# Patient Record
Sex: FEMALE | Race: WHITE | Employment: OTHER | URBAN - METROPOLITAN AREA
[De-identification: names, ages, dates, MRNs, and addresses within clinical notes are randomized per-mention and may not be internally consistent; named-entity substitution may affect disease eponyms.]

---

## 2020-02-26 PROBLEM — R09.A2 GLOBUS PHARYNGEUS: Status: ACTIVE | Noted: 2020-02-26

## 2020-02-26 PROBLEM — R09.89 GLOBUS PHARYNGEUS: Status: ACTIVE | Noted: 2020-02-26

## 2020-02-26 PROBLEM — R09.82 POST-NASAL DRAINAGE: Status: ACTIVE | Noted: 2020-02-26

## 2022-03-24 PROBLEM — C21.0 ANAL CANCER (HCC): Status: ACTIVE | Noted: 2022-03-24

## 2022-09-30 ENCOUNTER — HOSPITAL ENCOUNTER (OUTPATIENT)
Facility: HOSPITAL | Age: 72
Setting detail: HOSPITAL OUTPATIENT SURGERY
Discharge: HOME | End: 2022-09-30
Attending: COLON & RECTAL SURGERY | Admitting: COLON & RECTAL SURGERY
Payer: MEDICARE

## 2022-09-30 ENCOUNTER — ANESTHESIA (OUTPATIENT)
Dept: ENDOSCOPY | Facility: HOSPITAL | Age: 72
End: 2022-09-30
Payer: MEDICARE

## 2022-09-30 ENCOUNTER — ANESTHESIA EVENT (OUTPATIENT)
Dept: ENDOSCOPY | Facility: HOSPITAL | Age: 72
End: 2022-09-30
Payer: MEDICARE

## 2022-09-30 VITALS
TEMPERATURE: 98 F | DIASTOLIC BLOOD PRESSURE: 68 MMHG | WEIGHT: 157 LBS | BODY MASS INDEX: 22.48 KG/M2 | HEART RATE: 66 BPM | OXYGEN SATURATION: 98 % | SYSTOLIC BLOOD PRESSURE: 127 MMHG | HEIGHT: 70 IN | RESPIRATION RATE: 17 BRPM

## 2022-09-30 DIAGNOSIS — Z85.048 HISTORY OF ANAL CANCER: ICD-10-CM

## 2022-09-30 PROCEDURE — 0DBP8ZX EXCISION OF RECTUM, VIA NATURAL OR ARTIFICIAL OPENING ENDOSCOPIC, DIAGNOSTIC: ICD-10-PCS | Performed by: COLON & RECTAL SURGERY

## 2022-09-30 PROCEDURE — 88305 TISSUE EXAM BY PATHOLOGIST: CPT | Performed by: COLON & RECTAL SURGERY

## 2022-09-30 RX ORDER — SODIUM CHLORIDE, SODIUM LACTATE, POTASSIUM CHLORIDE, CALCIUM CHLORIDE 600; 310; 30; 20 MG/100ML; MG/100ML; MG/100ML; MG/100ML
INJECTION, SOLUTION INTRAVENOUS CONTINUOUS
OUTPATIENT
Start: 2022-09-30

## 2022-09-30 RX ORDER — SODIUM CHLORIDE, SODIUM LACTATE, POTASSIUM CHLORIDE, CALCIUM CHLORIDE 600; 310; 30; 20 MG/100ML; MG/100ML; MG/100ML; MG/100ML
INJECTION, SOLUTION INTRAVENOUS CONTINUOUS
Status: DISCONTINUED | OUTPATIENT
Start: 2022-09-30 | End: 2022-09-30

## 2022-09-30 RX ORDER — LIDOCAINE HYDROCHLORIDE 10 MG/ML
INJECTION, SOLUTION EPIDURAL; INFILTRATION; INTRACAUDAL; PERINEURAL AS NEEDED
Status: DISCONTINUED | OUTPATIENT
Start: 2022-09-30 | End: 2022-09-30 | Stop reason: SURG

## 2022-09-30 RX ORDER — NALOXONE HYDROCHLORIDE 0.4 MG/ML
80 INJECTION, SOLUTION INTRAMUSCULAR; INTRAVENOUS; SUBCUTANEOUS AS NEEDED
OUTPATIENT
Start: 2022-09-30 | End: 2022-09-30

## 2022-09-30 RX ADMIN — LIDOCAINE HYDROCHLORIDE 40 MG: 10 INJECTION, SOLUTION EPIDURAL; INFILTRATION; INTRACAUDAL; PERINEURAL at 14:18:00

## 2022-09-30 RX ADMIN — SODIUM CHLORIDE, SODIUM LACTATE, POTASSIUM CHLORIDE, CALCIUM CHLORIDE: 600; 310; 30; 20 INJECTION, SOLUTION INTRAVENOUS at 14:16:00

## 2022-09-30 NOTE — DISCHARGE SUMMARY
Outpatient Surgery Brief Discharge Summary         Patient ID:  Lala Carvajal  [id number]  81year old  9/11/1950    Discharge Diagnoses: History of anal cancer, anal rectal scar    Procedures: No discharge procedures on file. Discharged Condition: stable    Disposition: home    Patient Instructions: Follow-up with Di Burgess MD in 1-2 weeks.     Diet: regular diet  Activity: as tolerated    Edna Alvarez MD, FACS, FASCRS  9/30/2022  3:16 PM

## 2022-10-06 ENCOUNTER — EKG ENCOUNTER (OUTPATIENT)
Dept: LAB | Age: 72
End: 2022-10-06
Attending: FAMILY MEDICINE
Payer: MEDICARE

## 2022-10-06 ENCOUNTER — LAB ENCOUNTER (OUTPATIENT)
Dept: LAB | Age: 72
End: 2022-10-06
Attending: FAMILY MEDICINE
Payer: MEDICARE

## 2022-10-06 DIAGNOSIS — Z01.818 PREOPERATIVE EXAMINATION, UNSPECIFIED: ICD-10-CM

## 2022-10-06 DIAGNOSIS — M16.11 PRIMARY OSTEOARTHRITIS OF RIGHT HIP: Primary | ICD-10-CM

## 2022-10-06 LAB
ALBUMIN SERPL-MCNC: 3.3 G/DL (ref 3.4–5)
ALBUMIN/GLOB SERPL: 0.9 {RATIO} (ref 1–2)
ALP LIVER SERPL-CCNC: 46 U/L
ALT SERPL-CCNC: 19 U/L
ANION GAP SERPL CALC-SCNC: 7 MMOL/L (ref 0–18)
AST SERPL-CCNC: 15 U/L (ref 15–37)
ATRIAL RATE: 69 BPM
BASOPHILS # BLD AUTO: 0.02 X10(3) UL (ref 0–0.2)
BASOPHILS NFR BLD AUTO: 0.6 %
BILIRUB SERPL-MCNC: 0.5 MG/DL (ref 0.1–2)
BUN BLD-MCNC: 14 MG/DL (ref 7–18)
CALCIUM BLD-MCNC: 8.8 MG/DL (ref 8.5–10.1)
CHLORIDE SERPL-SCNC: 108 MMOL/L (ref 98–112)
CO2 SERPL-SCNC: 25 MMOL/L (ref 21–32)
CREAT BLD-MCNC: 0.81 MG/DL
CRP SERPL-MCNC: <0.29 MG/DL (ref ?–0.3)
EOSINOPHIL # BLD AUTO: 0.26 X10(3) UL (ref 0–0.7)
EOSINOPHIL NFR BLD AUTO: 7.9 %
ERYTHROCYTE [DISTWIDTH] IN BLOOD BY AUTOMATED COUNT: 13.9 %
ERYTHROCYTE [SEDIMENTATION RATE] IN BLOOD: 37 MM/HR
FASTING STATUS PATIENT QL REPORTED: NO
GFR SERPLBLD BASED ON 1.73 SQ M-ARVRAT: 78 ML/MIN/1.73M2 (ref 60–?)
GLOBULIN PLAS-MCNC: 3.8 G/DL (ref 2.8–4.4)
GLUCOSE BLD-MCNC: 92 MG/DL (ref 70–99)
HCT VFR BLD AUTO: 36.2 %
HGB BLD-MCNC: 11.3 G/DL
IMM GRANULOCYTES # BLD AUTO: 0.01 X10(3) UL (ref 0–1)
IMM GRANULOCYTES NFR BLD: 0.3 %
LYMPHOCYTES # BLD AUTO: 0.46 X10(3) UL (ref 1–4)
LYMPHOCYTES NFR BLD AUTO: 13.9 %
MCH RBC QN AUTO: 30.6 PG (ref 26–34)
MCHC RBC AUTO-ENTMCNC: 31.2 G/DL (ref 31–37)
MCV RBC AUTO: 98.1 FL
MONOCYTES # BLD AUTO: 0.29 X10(3) UL (ref 0.1–1)
MONOCYTES NFR BLD AUTO: 8.8 %
NEUTROPHILS # BLD AUTO: 2.26 X10 (3) UL (ref 1.5–7.7)
NEUTROPHILS # BLD AUTO: 2.26 X10(3) UL (ref 1.5–7.7)
NEUTROPHILS NFR BLD AUTO: 68.5 %
OSMOLALITY SERPL CALC.SUM OF ELEC: 290 MOSM/KG (ref 275–295)
P AXIS: 71 DEGREES
P-R INTERVAL: 162 MS
PLATELET # BLD AUTO: 227 10(3)UL (ref 150–450)
POTASSIUM SERPL-SCNC: 4.1 MMOL/L (ref 3.5–5.1)
PROT SERPL-MCNC: 7.1 G/DL (ref 6.4–8.2)
Q-T INTERVAL: 434 MS
QRS DURATION: 84 MS
QTC CALCULATION (BEZET): 465 MS
R AXIS: 24 DEGREES
RBC # BLD AUTO: 3.69 X10(6)UL
SODIUM SERPL-SCNC: 140 MMOL/L (ref 136–145)
T AXIS: 60 DEGREES
VENTRICULAR RATE: 69 BPM
WBC # BLD AUTO: 3.3 X10(3) UL (ref 4–11)

## 2022-10-06 PROCEDURE — 36415 COLL VENOUS BLD VENIPUNCTURE: CPT

## 2022-10-06 PROCEDURE — 85025 COMPLETE CBC W/AUTO DIFF WBC: CPT

## 2022-10-06 PROCEDURE — 93010 ELECTROCARDIOGRAM REPORT: CPT | Performed by: INTERNAL MEDICINE

## 2022-10-06 PROCEDURE — 86140 C-REACTIVE PROTEIN: CPT

## 2022-10-06 PROCEDURE — 80053 COMPREHEN METABOLIC PANEL: CPT

## 2022-10-06 PROCEDURE — 85652 RBC SED RATE AUTOMATED: CPT

## 2022-10-06 PROCEDURE — 93005 ELECTROCARDIOGRAM TRACING: CPT

## 2025-03-14 ENCOUNTER — HOSPITAL ENCOUNTER (OUTPATIENT)
Facility: HOSPITAL | Age: 75
Setting detail: HOSPITAL OUTPATIENT SURGERY
Discharge: HOME | End: 2025-03-14
Attending: COLON & RECTAL SURGERY | Admitting: COLON & RECTAL SURGERY
Payer: MEDICARE

## 2025-03-14 ENCOUNTER — ANESTHESIA (OUTPATIENT)
Dept: ENDOSCOPY | Facility: HOSPITAL | Age: 75
End: 2025-03-14
Payer: MEDICARE

## 2025-03-14 ENCOUNTER — ANESTHESIA EVENT (OUTPATIENT)
Dept: ENDOSCOPY | Facility: HOSPITAL | Age: 75
End: 2025-03-14
Payer: MEDICARE

## 2025-03-14 VITALS
RESPIRATION RATE: 19 BRPM | SYSTOLIC BLOOD PRESSURE: 145 MMHG | OXYGEN SATURATION: 100 % | HEART RATE: 77 BPM | HEIGHT: 70 IN | BODY MASS INDEX: 27.2 KG/M2 | WEIGHT: 190 LBS | DIASTOLIC BLOOD PRESSURE: 123 MMHG

## 2025-03-14 PROCEDURE — 0DJD8ZZ INSPECTION OF LOWER INTESTINAL TRACT, VIA NATURAL OR ARTIFICIAL OPENING ENDOSCOPIC: ICD-10-PCS | Performed by: COLON & RECTAL SURGERY

## 2025-03-14 RX ORDER — SODIUM CHLORIDE, SODIUM LACTATE, POTASSIUM CHLORIDE, CALCIUM CHLORIDE 600; 310; 30; 20 MG/100ML; MG/100ML; MG/100ML; MG/100ML
INJECTION, SOLUTION INTRAVENOUS CONTINUOUS
Status: DISCONTINUED | OUTPATIENT
Start: 2025-03-14 | End: 2025-03-14

## 2025-03-14 RX ORDER — GLYCOPYRROLATE 0.2 MG/ML
INJECTION, SOLUTION INTRAMUSCULAR; INTRAVENOUS AS NEEDED
Status: DISCONTINUED | OUTPATIENT
Start: 2025-03-14 | End: 2025-03-14 | Stop reason: SURG

## 2025-03-14 RX ORDER — AMOXICILLIN 500 MG/1
500 CAPSULE ORAL AS NEEDED
COMMUNITY
End: 2025-03-14

## 2025-03-14 RX ORDER — LIDOCAINE HYDROCHLORIDE 10 MG/ML
INJECTION, SOLUTION EPIDURAL; INFILTRATION; INTRACAUDAL; PERINEURAL AS NEEDED
Status: DISCONTINUED | OUTPATIENT
Start: 2025-03-14 | End: 2025-03-14 | Stop reason: SURG

## 2025-03-14 RX ADMIN — SODIUM CHLORIDE, SODIUM LACTATE, POTASSIUM CHLORIDE, CALCIUM CHLORIDE: 600; 310; 30; 20 INJECTION, SOLUTION INTRAVENOUS at 15:48:00

## 2025-03-14 RX ADMIN — LIDOCAINE HYDROCHLORIDE 50 MG: 10 INJECTION, SOLUTION EPIDURAL; INFILTRATION; INTRACAUDAL; PERINEURAL at 15:15:00

## 2025-03-14 RX ADMIN — GLYCOPYRROLATE 0.2 MG: 0.2 INJECTION, SOLUTION INTRAMUSCULAR; INTRAVENOUS at 15:11:00

## 2025-03-14 NOTE — ANESTHESIA POSTPROCEDURE EVALUATION
Patient: Mandy Manriquez    Procedure Summary       Date: 03/14/25 Room / Location: [facility] ENDOSCOPY 04 / [facility] ENDOSCOPY    Anesthesia Start: 1510 Anesthesia Stop: 1554    Procedure: COLONOSCOPY Diagnosis:       History of anal cancer      (diverticulosis and mild radiation proctitis)    Surgeons: Toan Garcia MD Anesthesiologist: Tramaine Escamilla CRNA    Anesthesia Type: MAC ASA Status: 3            Anesthesia Type: MAC    Vitals Value Taken Time   /79 03/14/25 1552   Temp 36.6 03/14/25 1554   Pulse 87 03/14/25 1554   Resp 18 03/14/25 1554   SpO2 98 % 03/14/25 1554   Vitals shown include unfiled device data.    [facility] AN Post Evaluation:   Patient Evaluated in PACU  Patient Participation: complete - patient participated  Level of Consciousness: awake and awake and alert  Pain Score: 0  Pain Management: satisfactory to patient  Airway Patency:patent  Yes    Cardiovascular Status: acceptable  Respiratory Status: acceptable  Postoperative Hydration acceptable      Tramaine Escamilla CRNA  3/14/2025 3:54 PM

## 2025-03-14 NOTE — ANESTHESIA PREPROCEDURE EVALUATION
Anesthesia PreOp Note    HPI:     Mandy Manriquez is a 74 year old female who presents for preoperative consultation requested by: Toan Garcia MD    Date of Surgery: 3/14/2025    Procedure(s):  COLONOSCOPY  Indication: History of anal cancer    Relevant Problems   No relevant active problems       NPO:  Last Liquid Consumption Date: 03/14/25  Last Liquid Consumption Time: 0730  Last Solid Consumption Date: 03/13/25  Last Solid Consumption Time: 0630  Last Liquid Consumption Date: 03/14/25          History Review:  Patient Active Problem List    Diagnosis Date Noted    Anal cancer (HCC) 03/24/2022    Globus pharyngeus 02/26/2020    Post-nasal drainage 02/26/2020    Vitamin D deficiency     Vitamin B deficiency     Pituitary tumor     Multiple thyroid nodules     Hypothyroidism     History of squamous cell carcinoma     Disorder of optic chiasm     Asymmetrical sensorineural hearing loss     Mixed hyperlipidemia     History of malignant melanoma 01/01/1992       Past Medical History:    Allergic rhinitis    Anxiety    Asymmetrical sensorineural hearing loss    Atypical squamous cells of undetermined significance (ASCUS) on Papanicolaou smear of cervix    Back problem    Benign tumor of pituitary gland (HCC)    CAUSING VISION IMPAIRMENT(SEEING SHAWDOW IMAGE)    Calculus of kidney    Chronic renal impairment    Cyst of ovary    Disorder of bone and articular cartilage    Disorder of optic chiasm    Associated with pituitary neoplasms and/or disorders    Disorder of thyroid    Endometriosis    Epilepsy (HCC)    Exposure to medical diagnostic radiation    High cholesterol    History of kidney stones    History of malignant melanoma    rt thigh    History of squamous cell carcinoma    Hypothyroidism    Mixed hyperlipidemia    Multiple thyroid nodules    US 3/16 with s/p rt thyroidectomy and left with subcent nodule     Osteoporosis    Osteoporosis of the spine and mild osteopenia of left femoral neck.  Previous  DEXA was done 9/17 with a T score of -2.2 in the spine, left femoral neck -1.3    Personal history of antineoplastic chemotherapy    Pituitary tumor    Rectal cancer (HCC)    Squamous cell    Skin cancer    Visual impairment    LEFT EYE DUE TO TUMOR IN PITUITARY GLAND    Vitamin B deficiency    Vitamin D deficiency       Past Surgical History:   Procedure Laterality Date    Colonoscopy  08/1914    Dr. Maurer 5 year recall    Colonoscopy  10/23/2019    Libertad hyperplastic polyps recall in 7 yrs    Hysterectomy  12/11/2015    total    Other surgical history  2000    pituitary adenoma hypophysectomy    Other surgical history  2007    craniotomy for pituitary tumor removal    Thyroidectomy Right 1997       Prescriptions Prior to Admission[1]  Current Medications and Prescriptions Ordered in Epic[2]    Allergies[3]    Family History   Problem Relation Age of Onset    Heart Disease Mother     High Cholesterol Mother     Hypertension Mother     Osteoporosis Mother     Other (rheumatoid arthritis) Mother     Other (Hydrocephalus complications) Father     Migraines Sister     Melanoma Sister     Other (Other) Sister         skin cancer    Other (Multiple sclerosis) Sister     Other (Peptic ulcer) Sister     Colon Cancer Paternal Grandfather     Colon Cancer Maternal Grandfather      Social History     Socioeconomic History    Marital status:    Tobacco Use    Smoking status: Never    Smokeless tobacco: Never   Vaping Use    Vaping status: Never Used   Substance and Sexual Activity    Alcohol use: Yes     Alcohol/week: 1.0 - 2.0 standard drink of alcohol     Types: 1 - 2 Glasses of wine per week    Drug use: Never    Sexual activity: Not Currently     Partners: Male   Other Topics Concern    Special Diet Yes     Comment: Mediterranean    Seat Belt Yes       Available pre-op labs reviewed.             Vital Signs:  Body mass index is 27.26 kg/m².   height is 1.778 m (5' 10\") and weight is 86.2 kg (190 lb). Her blood  pressure is 154/67 and her pulse is 72. Her respiration is 17 and oxygen saturation is 98%.   Vitals:    03/10/25 1418 03/14/25 1432   BP:  154/67   Pulse:  72   Resp:  17   SpO2:  98%   Weight: 86.2 kg (190 lb)    Height: 1.778 m (5' 10\")         Anesthesia Evaluation     Patient summary reviewed    No history of anesthetic complications   Airway   Mallampati: II  TM distance: >3 FB  Neck ROM: full  Dental - Dentition appears grossly intact     Pulmonary - negative ROS and normal exam   Cardiovascular - normal exam  Exercise tolerance: good  (+) hypertension well controlled    Neuro/Psych    (+)  seizures well controlled, anxiety/panic attacks,        Comments: Had 1 seizure post 1d craniotomy    GI/Hepatic/Renal - negative ROS     Endo/Other - negative ROS   Abdominal                  Anesthesia Plan:   ASA:  3  Plan:   MAC  Informed Consent Plan and Risks Discussed With:  Patient  Discussed plan with:  CRNA      I have informed Mandy Manriquez and/or legal guardian or family member of the nature of the anesthetic plan, benefits, risks including possible dental damage if relevant, major complications, and any alternative forms of anesthetic management.   All of the patient's questions were answered to the best of my ability. The patient desires the anesthetic management as planned.  Tramaine Escamilla CRNA  3/14/2025 3:08 PM  Present on Admission:  **None**           [1]   Medications Prior to Admission   Medication Sig Dispense Refill Last Dose/Taking    amoxicillin 500 MG Oral Cap Take 1 capsule (500 mg total) by mouth as needed (Take four tablets as needed prior to procedure). Take four tablets as needed prior to procedure   Taking As Needed    atorvastatin 80 MG Oral Tab Take 1 tablet (80 mg total) by mouth daily. 90 tablet 3 3/12/2025    Levothyroxine Sodium 112 MCG Oral Tab Take 1 tablet (112 mcg total) by mouth daily.   3/14/2025 Morning    Multiple Vitamins-Minerals (MULTIVITAMIN ADULT) Oral Tab Take 1  tablet by mouth daily.   3/10/2025    OCUVITE-LUTEIN Oral Tab Take 1 tablet by mouth daily with breakfast.   3/10/2025   [2]   Current Facility-Administered Medications Ordered in Epic   Medication Dose Route Frequency Provider Last Rate Last Admin    lactated ringers infusion   Intravenous Continuous Toan Garcia MD 20 mL/hr at 03/14/25 1445 New Bag at 03/14/25 1445    sodium chloride 0.9% infusion  25 mL/hr Intravenous Continuous Saud Mcmullen MD         No current [facility]-ordered outpatient medications on file.   [3]   Allergies  Allergen Reactions    Corn NAUSEA AND VOMITING     Dizziness     Gabapentin DIZZINESS     dizzy    Other DIZZINESS     Cranberries/blueberries/cherries   Headache  Corn congestion/dizziness    Seasonal OTHER (SEE COMMENTS)     CONGESTION

## 2025-03-14 NOTE — DISCHARGE INSTRUCTIONS
ENDOSCOPY DISCHARGE INSTRUCTIONS    Procedure Performed:   Colonoscopy    Endoscopist: No name on file  FINDINGS:   Diverticulosis (pockets in colon that develop with age and lack of fiber intake) and mild radiation proctitis    MEDICATIONS:  You may resume all other medications today    DIET:  High fiber diet    BIOPSIES:  No biopsies were taken    X-RAYS/LABS:   No X-rays/Labs were ordered today    ADDITIONAL RECOMMENDATIONS:  Repeat colonoscopy in 5 years, March 2030      Activity for remainder of today:    REST TODAY  DO NOT drive or operate heavy machinery  DO NOT drink any alcoholic beverages  DO NOT sign any legal documents or make any important decisions    After your procedure(s):  It is not unusual to feel bloated or gassy .  Passing gas and belching is encouraged. Lying on your left side with your knees flexed may relieve the discomfort. A hot pack to the abdomen may also help.    After your gastroscopy (upper endoscopy): You may experience a slight sore throat which will subside. Throat lozenges or salt water gargle can be used.    FOLLOW-UP:  Contact the office for follow-up appointment is needed or if you develop any of the following:    Severe abdominal pain/discomfort     Excessive bleeding                     Black tarry stool    Difficulty breathing/swallowing      Persistent nausea/vomiting  Fever above 100 degrees or chills            Home Care Instructions for Colonoscopy with Sedation    Diet:  - Resume your regular diet as tolerated unless otherwise instructed.  - Start with light meals to minimize bloating.  - Do not drink alcohol today.    Medication:  - If you have questions about resuming your normal medications, please contact your Primary Care Physician.    Activities:  - Take it easy today. Do not return to work today.  - Do not drive today.  - Do not operate any machinery today (including kitchen equipment).    Colonoscopy:  - You may notice some rectal \"spotting\" (a little blood on  the toilet tissue) for a day or two after the exam. This is normal.  - If you experience any rectal bleeding (not spotting), persistent tenderness or sharp severe abdominal pains, oral temperature over 100 degrees Fahrenheit, light-headedness or dizziness, or any other problems, contact your doctor.    **If unable to reach your doctor, please go to the [facility] Emergency Room**    - Your referring physician will receive a full report of your examination.  - If you do not hear from your doctor's office within two weeks of your biopsy, please call them for your results.    You may be able to see your laboratory results in [employer] between 4 and 7 business days.  In some cases, your physician may not have viewed the results before they are released to [employer].  If you have questions regarding your results contact the physician who ordered the test/exam by phone or via [employer] by choosing \"Ask a Medical Question.\"

## 2025-03-14 NOTE — DISCHARGE SUMMARY
Outpatient Surgery Brief Discharge Summary         Patient ID:  Mandy Manriquez  [id number]  74 year old  9/11/1950    Admission date:  03/14/25    Discharge date: 03/14/25    Discharge Diagnoses: diverticulosis and mild radiation proctitis    Procedures: No discharge procedures on file.    Discharged Condition: stable    Disposition: home    Patient Instructions: repeat colonoscopy in 5 years    Diet: regular diet  Activity: no driving for 24 hours    Toan Garcia MD, FACS, FASCRS  3/14/2025  3:56 PM

## 2025-03-14 NOTE — OPERATIVE REPORT
COLONOSCOPY REPORT    Patient Name: Mandy Manriquez    Date of Surgery: 03/14/25     Preoperative Diagnosis: History of anal cancer, history colon polyps    Postoperative Diagnosis: same    Primary Surgeon: Toan Garcia MD    Assistant: none    Procedures: Colonoscopy    Surgical Findings:  Left posterior scar at anorectal junction, without mass effect  Angulated, fixed sigmoid  Diverticulosis sigmoid > descending and transverse    Anesthesia: MAC MAC    Complications: none    Specimen: none    Condition: good    Estimated Blood Loss: none      INDICATIONS:   The patient is a 74 year old female with history of anal cancer treated with Thea protocol and history colon polyps. It is now 3 year since she underwent treatment and presents for surveillance colonoscopy. The procedure, indications, risks, benefits and alternatives were discussed with the patient prior to the procedure. All questions were answered, and she wished to proceed.       Last colonoscopy:  March 10, 2022      BOWEL PREP:  Aronchick bowel prep: 1    Aronchick bowel prep scale:  5 Inadequate (repeat preparation needed)  4 Poor (semisolid stool could not be suctioned and <90% of mucosa seen)  3 Fair (semisolid stool could not be suctioned, but >90% of mucosa seen)  2 Good (clear liquid covering up to 25% of mucosa, but >90% of mucosa seen)  1 Excellent (>95% of mucosa seen)    COLONOSCOPY TIMES:  Start:  1510  Cecum:  1535  End:  1550    TECHNIQUE:   The patient was taken to the endoscopy suite and appropriate monitoring for deep sedation established. She was then sedated by anesthesia and placed in left lateral decubitus position. Digital rectal examination was performed which was notable for left posterior scar at anorectal junction without mass effect.     The colonoscope was then inserted transanally and advanced to the cecum. The cecum was identified by the appendiceal orifice and ileocecal valve. Bowel preparation was excellent. Careful  inspection of the mucosa was made upon withdrawal of the colonoscope.     There were no polyps. Diverticulosis was present in the sigmoid > descending/transverse colon. There were mild changes of radiation proctitis. A scar was present at the anorectal junction without mass effect. Retroflexion was attempted in the rectum but was not feasible due to limited rectal compliance.    The colonoscope was removed, and the patient tolerated the procedure well. The abdomen was soft and nontender at the end of the examination.      IMPRESSION:  Diverticulosis  Mild radiation proctitis    RECOMMENDATIONS:  Repeat colonoscopy in 5 years (March 2030)      Toan Garcia MD    _____________________________________   Attending Surgeon: Toan Garcia MD   Dictated By: Toan Garcia MD

## 2025-03-14 NOTE — H&P
[facility]  Preop H&P - Colon and Rectal Surgery  Toan Garcia MD    CHIEF COMPLAINT: Patient presents with: history colon polyps, history SCCa anal canal        HISTORY OF PRESENT ILLNESS:  Mandy Manriquez is a 74 year old female who presents for colonoscopy for adenoma surveillance.  Thea protocol was completed late June 2022.  She last saw Dr. LI Centeno in Oncology on 10/19/2023, surveillance PET scan advised for September 2024 not completed.  Her last colonoscopy was in March 2022.    INTERVAL HISTORY  She has been feeling well, has not noted any significant symptoms.   Stool caliber slightly larger  Bms other wise are unchanged.  There is no anal pain or bleeding.    INTERVAL HISTORY (6/04/2024)  She has been feeling well, has not noted any significant symptoms.   She notes an itching/scratching feeling when she is in the shower.  This was thought to be related to the soap she has been using, and she has been using Hibiclens with better tolerance.  Blood has not been seen with the exception of a spot of blood on the towel after a bath yesterday.  Anal pain is denied.    Bowel habits are unchanged except urgency with fatty foods.   Stools are slightly decreased in caliber, but are not considered narrow.   She has 2 bowel movements in a short period of time each morning.  She has not had incontinence.    INTERVAL HISTORY (8/26/2022)  Anal pain was present during treatment, which is now nearly resolved.  She is not noting bleeding or discharge from the anus.  She denies vaginal discharge.    BMs are now twice daily, soft and formed (had been narrow but now somewhat larger), urgency is present.  Diarrhea is induced with fatty food intake.    INITIAL HISTORY (3/25/2022)  She has had rectal bleeding for about 3 months.  It is intermittent, only associated with BMs, and seen in the toilet water.    She has noted a drop of blood or brown discharge from the vagina.  She has not noted air from the vagina.    Bowel  habits:   Frequency: Baseline each morning, recently may have 2 BMs over 1 hour  Consistency: Soft and formed, more narrow recently  Urgency: yes, 1 minute warning  Continence: Rare incontinence    Colonoscopy was performed and showed a mass in the rectum.  She has since had a rectal ultrasound, CT, MRI and is scheduled for a PET scan on 3/30/2022.    HISTORY:  Past Medical History:   Diagnosis Date   Allergic rhinitis   Anal cancer (HCC) 03/2022   Squamous cell   Anxiety   Asymmetrical sensorineural hearing loss   Atypical squamous cells of undetermined significance (ASCUS) on Papanicolaou smear of cervix   Calculus of kidney   Chronic renal impairment   Colon cancer (HCC)   2022   Cyst of ovary   Disorder of bone and articular cartilage   Disorder of optic chiasm   Associated with pituitary neoplasms and/or disorders   Disorder of thyroid   Endometriosis   Epilepsy (HCC)   had seizure post op. not on meds since   High cholesterol   History of kidney stones   History of malignant melanoma 1992   rt thigh   History of squamous cell carcinoma   Hypothyroidism   Mixed hyperlipidemia   Multiple thyroid nodules   US 3/16 with s/p rt thyroidectomy and left with subcent nodule   Osteoporosis 03/04/2020   Osteoporosis of the spine and mild osteopenia of left femoral neck. Previous DEXA was done 9/17 with a T score of -2.2 in the spine, left femoral neck -1.3. Meds started 3/20   Pituitary tumor   Seizure disorder (HCC) 2008   POST OF BRAIN SURGERY JUST ONCE   Skin cancer   Vitamin B deficiency   Vitamin D deficiency     Past Surgical History:   Procedure Laterality Date   COLONOSCOPY 08/1914   Dr. Maurer 5 year recall   COLONOSCOPY 10/23/2019   [place] hyperplastic polyps recall in 7 yrs   HYSTERECTOMY 12/11/2015   total   OTHER SURGICAL HISTORY 2000   pituitary adenoma hypophysectomy   OTHER SURGICAL HISTORY 2007   craniotomy for pituitary tumor removal   THYROIDECTOMY Right 1997   TOTAL HIP REPLACEMENT Right 12/2022      Family History   Problem Relation Age of Onset   Heart Disease Mother   High Cholesterol Mother   Hypertension Mother   Osteoporosis Mother   Other (rheumatoid arthritis) Mother   Other (Hydrocephalus complications) Father   Migraines Sister   Melanoma Sister   Other (Other) Sister   skin cancer   Other (Multiple sclerosis) Sister   Other (Peptic ulcer) Sister   Colon Cancer Paternal Grandfather   Colon Cancer Maternal Grandfather     Social History  Tobacco Use  Smoking status: Never  Smokeless tobacco: Never  Vaping Use  Vaping status: Never Used  Alcohol use: Yes  Comment: social  Drug use: Never        CURRENT MEDICATIONS:   Current Outpatient Medications   Medication Sig Dispense Refill   atorvastatin 80 MG Oral Tab TAKE 1 TABLET EVERY DAY 90 tablet 1   loratadine 10 MG Oral Tab Take 10 mg by mouth daily.   cholecalciferol 25 MCG (1000 UT) Oral Tab Take 1,000 Units by mouth daily.   TURMERIC OR Take by mouth.   Levothyroxine Sodium 112 MCG Oral Tab Take 112 mcg by mouth daily.   Multiple Vitamins-Minerals (MULTIVITAMIN ADULT) Oral Tab Take 1 tablet by mouth daily.   OCUVITE-LUTEIN Oral Tab Take 1 tablet by mouth daily with breakfast.         ALLERGIES:  Corn, Gabapentin, and Other    REVIEW OF SYSTEMS:  Constitutional: Decreased appetite, 10 pound weight loss since cancer diagnosis  Eyes: Ocular neuromyotonia, from pituitary tumor radiation  Ears/Nose/Throat: Decreased hearing  Respiratory: normal  Cardiac/Vascular: normal  GI: See HPI  : History kidney stones  Musculoskeletal: Joint pain, back pain  Skin: History skin cancers (BCC, SCCa, and Melanoma)  Neurologic: normal  Psychiatric: normal  Endocrine: normal  Hematology/Lymphatics: normal  Allergy/Immunology: normal    PHYSICAL EXAM:   Ht 5' 10\" (1.778 m)  Wt 190 lb (86.2 kg)  LMP (LMP Unknown)  BMI 27.26 kg/m²   Body mass index is 27.26 kg/m².    CONSTITUTIONAL: awake, alert, cooperative, no apparent distress, and appears stated age  EYES: Lids and  lashes normal, sclera clear, conjunctiva normal  ENT: Normocephalic, without obvious abnormality, atraumatic  NECK: Supple, symmetrical, trachea midline, no adenopathy, thyroid symmetric, not enlarged and no tenderness  HEMATOLOGIC/LYMPHATICS: No cervical lymphadenopathy, no supraclavicular lymphadenopathy, no inguinal lymphadenopathy  LUNGS: No increased work of breathing, good air exchange, clear to auscultation bilaterally, no crackles or wheezing  CARDIOVASCULAR: Normal apical impulse, regular rate and rhythm, and no murmur noted, no pedal edema  ABDOMEN: Lower abdominal scar, normal bowel sounds, soft, non-distended, non-tender, no masses palpated, no hepatosplenomegaly    RECTAL:   External skin is normal  Anal tags are not notefd  Prolapse is not noted with straining  Resting tone is normal  Squeeze tone is normal  Mass is not palpable  Scar palpable left lateral anal canal, no mass noted    RECTAL: (initial exam, 3/25/2022)  External skin is normal  Anal tags are not notefd  Prolapse is not noted with straining  Resting tone is normal  Squeeze tone is normal  Mass is palpable  Left lateral proximal anal canal, at level of pelvic floor muscles  3 cm diameter  Firm, fixed, excavated    GENITOURINARY:  Bidigital exam without vaginal invasion    MUSCULOSKELETAL: Full range of motion noted. Motor strength is 5 out of 5 all extremities bilaterally.   SKIN: no rashes and no jaundice  PSYCHIATRIC:   Orientation: normal  Appearance: normal  Behavior: normal  Attitude toward examiner: normal  Affect: normal  Judgment: Normal    DATA:   COLONOSCOPY 3/10/2022 - Dr. LI Maurer    EUS 3/14/2022    CT A/P 3/18/2022  IMPRESSION:   Low rectal mass extending to the anal canal without evidence of metastasis.     MRI Rectum 3/24/2022  FINDINGS:   Tumor size: 3.6 cm craniocaudal dimension; 2.5 cm x 3.2 cm transverse dimensions.   There is a heterogeneously enhancing mass epicentered along the anal canal extending into the lower    rectum. The mass involves the anterior, left lateral, and posterior walls extending from 11:00 to   8:00 along the clock face. There is extension through the left internal sphincter, intersphincteric   fat, external sphincter, as well as the left levator ani muscles.     T stage: T2     Functional sequences:   DWI: Restricted diffusion.     Lymph nodes: N0 no visible or suspicious regional lymph nodes.     Incidental findings: There is colonic diverticulosis. The urinary bladder is mildly distended and   grossly unremarkable. There is no pelvic free fluid.    Flex Sig 9/30/2022  Rectal Exam: palpable firm area with nodular surface left anterior at anorectal junction.    Findings:   Scarring and hypertrophic anal papillae am mucosal nodule, no discrete mass  Biopsies obtained from the scar and mucosal nodule    Impression: History anal cancer, no clear cut persistent mass    Final Diagnosis:     Anal/rectal junction; biopsy:  Fragment of rectal glandular mucosa demonstrating focal minimal/mild acute proctitis characterized by preservation of the glandular architecture with focal hyperplastic mucosal changes, focal minimal/mild acute inflammatory infiltrates with single crypt abscess, diffuse mild congestive vascular dilatation, mild hyperplastic changes of the muscularis mucosa, and edematous expansion of the lamina propria.  Fragments of focally mildly reactive-appearing squamous anal epithelium and underlying soft tissue demonstrating focal mild acute and chronic inflammatory infiltrates and areas of epithelial spongiosis.  No evidence of tissue-invasive fungal organisms, parasitic organisms, viral inclusions, epithelioid granulomas, polyps, atypical melanocytic cell proliferation, epithelial dysplasia, or malignancy identified.    Comment:  Clinically, a history of anal squamous cell carcinoma status post Thea protocol is provided. Considering this history, multiple histologic level sections of the anal/rectal  junction biopsy (2 slides, 8 levels) show no evidence of epithelial dysplasia, squamous cell carcinoma, or other malignant neoplastic infiltrates. Recommend additional clinical and endoscopic correlation with follow-up as clinically indicated..      FLEX SIG 3/28/2023 - TONY Garcia MD  Findings:   Scarring and hypertrophic anal papillae and mucosal nodule, no discrete mass  Biopsies obtained from the scar and mucosal nodule    Complications: There were no complications.    Impression: History anal cancer, no clear cut persistent mass    Recommendations: Await biopsy, likely repeat surveillance exam in 3 months     Final Diagnosis   Anorectal junction; biopsy:  - Minute fragments of hyperkeratotic squamous mucosa, negative for dysplasia  - Please see comment     CT C/A/P 01/29/2025  =====   COMBINED IMPRESSION:    No evidence of metastatic disease to the chest, abdomen, or pelvis.       ASSESSMENT AND PLAN:    Anal canal cancer  Squamous cell carcinoma on biopsy  Proximal anal canal/anorectal junction, left lateral  Thea protocol completed 2 3/4 years ago, late June 2022  Residual mass palpable, suspicious for persistent disease at visit March 2023 with negative flex sig and biopsy   Currently appears MARISSA on clinical exam    History of colon adenoma  Last colonoscopy with adenoma March 2022  Patient advised to arrange surveillance exam with Dr. Maurer 3 years from last exam (March 2025)  Patient prefers I perform colonoscopy    Advise  -RTC 6 months for surveillance exam  -Colonoscopy March 2025 (history of colonic adenoma March 2022)      Prior A/P  Anal canal cancer  Squamous cell carcinoma on biopsy  Proximal anal canal/anorectal junction, left lateral  Invading through both internal and external sphincter to levator muscle  Does not appear to be invading vagina by clinical exam or MRI    Advise Thea protocol as primary treatment  She has met with Dr. LI Centeno and Dr. RED Centeno.    Surgical intervention reserved for  persistence or recurrence of cancer after chemoradiation    RTC 3 months following completion of Thea protocol for initial surveillance exam.      The patient was educated regarding the condition, treatment options, and instructed in the above treatment plan.    Toan Garcia MD, MD, FACS, FASCRS

## (undated) DEVICE — KIT ENDO ORCAPOD 160/180/190

## (undated) DEVICE — FORCEP RADIAL JAW 4

## (undated) DEVICE — MEDI-VAC NON-CONDUCTIVE SUCTION TUBING 6MM X 1.8M (6FT.) L: Brand: CARDINAL HEALTH

## (undated) DEVICE — V2 SPECIMEN COLLECTION MANIFOLD KIT: Brand: NEPTUNE

## (undated) DEVICE — 60 ML SYRINGE REGULAR TIP: Brand: MONOJECT

## (undated) DEVICE — LINE MNTR ADLT SET O2 INTMD

## (undated) DEVICE — Device

## (undated) DEVICE — KIT CLEAN ENDOKIT 1.1OZ GOWNX2

## (undated) NOTE — LETTER
[facility]  Administration of Anesthesia  IMandy agree to be cared for by a physician anesthesiologist alone and/or with a nurse anesthetist, who is specially trained to monitor me and give me medicine to put me to sleep or keep me comfortable during my procedure    I understand that my anesthesiologist and/or anesthetist is not an employee or agent of [facility] or [employer]. He or she works for [facility]    As the patient asking for anesthesia services, I agree to:  Allow the anesthesiologist (anesthesia doctor) to give me medicine and do additional procedures as necessary. Some examples are: Starting or using an “IV” to give me medicine, fluids or blood during my procedure, and having a breathing tube placed to help me breathe when I’m asleep (intubation). In the event that my heart stops working properly, I understand that my anesthesiologist will make every effort to sustain my life, unless otherwise directed by [facility] Do Not Resuscitate documents.  Tell my anesthesia doctor before my procedure:  If I am pregnant.  The last time that I ate or drank.  iii. All of the medicines I take (including prescriptions, herbal supplements, and pills I can buy without a prescription (including street drugs/illegal medications). Failure to inform my anesthesiologist about these medicines may increase my risk of anesthetic complications.  iv.If I am allergic to anything or have had a reaction to anesthesia before.  I understand how the anesthesia medicine will help me (benefits).  I understand that with any type of anesthesia medicine there are risks:  The most common risks are: nausea, vomiting, sore throat, muscle soreness, damage to my eyes, mouth, or teeth (from breathing tube placement).  Rare risks include: remembering what happened during my procedure, allergic reactions to medications, injury to my airway, heart, lungs, vision, nerves,  or muscles and in extremely rare instances death.  My doctor has explained to me other choices available to me for my care (alternatives).  Pregnant Patients (“epidural”):  I understand that the risks of having an epidural (medicine given into my back to help control pain during labor), include itching, low blood pressure, difficulty urinating, headache or slowing of the baby’s heart. Very rare risks include infection, bleeding, seizure, irregular heart rhythms and nerve injury.  Regional Anesthesia (“spinal”, “epidural”, & “nerve blocks”):  I understand that rare but potential complications include headache, bleeding, infection, seizure, irregular heart rhythms, and nerve injury.    _____________________________________________________________________________  Patient (or Representative) Signature/Relationship to Patient  Date   Time    _____________________________________________________________________________   Name (if used)    Language/Organization   Time    _____________________________________________________________________________  Nurse Anesthetist Signature     Date   Time  _____________________________________________________________________________  Anesthesiologist Signature     Date   Time  I have discussed the procedure and information above with the patient (or patient’s representative) and answered their questions. The patient or their representative has agreed to have anesthesia services.    _____________________________________________________________________________  Witness        Date   Time  I have verified that the signature is that of the patient or patient’s representative, and that it was signed before the procedure  Patient Name: Mandy Manriquez     : 1950                 Printed: 3/11/2025 at 9:23 AM    Medical Record #: [id number]                                            Page 1 of 1  ----------ANESTHESIA CONSENT----------

## (undated) NOTE — LETTER
[address], [place]  Authorization for Invasive Procedures  1. I hereby authorize Dr. Krystal Phoenix , my physician and whomever may be designated as the doctor's assistant, to perform the following operation and/or procedure:  Colonoscopy/ Flexible Sigmoidoscopy on Temitope Ndiaye at [facility].    2. My physician has explained to me the nature and purpose of the operation or other procedure, possible alternative methods of treatment, the risks involved and the possibility of complications to me. I understand the probable consequences of declining the recommended procedure and the alternative methods of treatment. I acknowledge that no guarantee has been made as to the result that may be obtained. 3. I recognize that during the course of this operation or other procedure, unforeseen conditions may necessitate additional or different procedures than those listed above. I, therefore, further authorize and request that the above-named physician, his/her physician assistants, or designees perform such procedures as are, in his/her professional opinion, necessary and desirable. If I have a Do Not Attempt Resuscitation (DNAR) order in place, that status will be suspended while in the operating room, procedural suite, and during the recovery period unless otherwise explicitly stated by me (or a person authorized to consent on my behalf). The surgeon or my attending physician will determine when the applicable recovery period ends for purposes of reinstating the DNAR order. 4. Should the need arise during my operation or immediate post-operative period; I also consent to the administration of blood and/or blood products.  Further, I understand that despite careful testing and screening of blood and blood products, I may still be subject to ill effects as a result of recieving a blood transfusion an/or blood producst. The following are some, but not all, of the potential risks that can occur: fever and allergic reactions, hemolytic reactions, transmission of disease such as hepatitis, AIDS, cytomegalovirus (CMV), and flluid overload. In the event that I wish to have autologous transfusions of my own blood, or a directed donor transfusion, I will discuss this with my physician. 5. I consent to the photographing of the operations or procedures to be performed for the purposes of advancing medicine, science, and/or education, provided my identity is not revealed. If the procedure has been videotaped, the physician/surgeon will obtain the original videotape. The hospital will not be responsible for storage or maintenance of this tape. 6. I consent to the presence of a  or observer as deemed necessary by my physician or his designee. 7. Any tissues or organs removed in the operation or other procedure may be disposed of by and at the discretion of [facility].    8. I understand that the physician and his/her physician assistants may not be employees or agents of [facility], [facility], nor [facility], but are independent medical practitioners who have been permitted to use its facilities for the care and treatment of their patients. 9. Patients having a sterilization procedure: I understand that if the procedure is successful the results will be permanent and it will therefore be impossible for me to inseminate, conceive or bear children. I also understand that the procedure is intended to result in sterility, although the result has not been guaranteed. 10. I CERTIFY THAT I HAVE READ AND FULLY UNDERSTAND THE ABOVE CONSENT TO OPERATION and/or OTHER PROCEDURE. 11. I acknowledge that my physician has explained sedation/analgesia administration to me including the risks and benefits.  I consent to the administration of sedation/analgesia as may be necessary or desirable in the judgment of my physician. Signature of Patient:  ________________________________________________ Date: _________Time: _________    Responsible person in case of minor or unconscious: _____________________________Relationship: ____________     Witness Signature: ____________________________________________ Date: __________ Time: ___________    Statement of Physician  My signature below affirms that prior to the time of the procedure, I have explained to the patient and/or her legal representative, the risks and benefits involved in the proposed treatment and any reasonable alternative to the proposed treatment. I have also explained the risks and benefits involved in the refusal of the proposed treatment and have answered the patient's questions. If I have a significant financial interest in this procedure/surgery, I have disclosed this and had a discussion with my patient.     Signature of Physician:   ________________________________________Date: _________Time:_______ Patient Name: Fermin Corea  : 1950  Printed: 2022    Medical Record #: [id number]

## (undated) NOTE — LETTER
[facility]  [address], [place], [place]    Authorization for Surgical Operation and Procedure                               I hereby authorize Toan Garcia MD, my physician and his/her assistants (if applicable), which may include medical students, residents, and/or fellows, to perform the following surgical operation/ procedure and administer such anesthesia as may be determined necessary by my physician: Operation/Procedure name (s) COLONOSCOPY on Mandy Manriquez   2.   I recognize that during the surgical operation/procedure, unforeseen conditions may necessitate additional or different procedures than those listed above.  I, therefore, further authorize and request that the above-named surgeon, assistants, or designees perform such procedures as are, in their judgment, necessary and desirable.    3.   My surgeon/physician has discussed prior to my surgery the potential benefits, risks and side effects of this procedure; the likelihood of achieving goals; and potential problems that might occur during recuperation.  They also discussed reasonable alternatives to the procedure, including risks, benefits, and side effects related to the alternatives and risks related to not receiving this procedure.  I have had all my questions answered and I acknowledge that no guarantee has been made as to the result that may be obtained.    4.   Should the need arise during my operation/procedure, which includes change of level of care prior to discharge, I also consent to the administration of blood and/or blood products.  Further, I understand that despite careful testing and screening of blood or blood products by collecting agencies, I may still be subject to ill effects as a result of receiving a blood transfusion and/or blood products.  The following are some, but not all, of the potential risks that can occur: fever and allergic reactions, hemolytic reactions, transmission of diseases such  as Hepatitis, AIDS and Cytomegalovirus (CMV) and fluid overload.  In the event that I wish to have an autologous transfusion of my own blood, or a directed donor transfusion, I will discuss this with my physician.  Check only if Refusing Blood or Blood Products  I understand refusal of blood or blood products as deemed necessary by my physician may have serious consequences to my condition to include possible death. I hereby assume responsibility for my refusal and release the hospital, its personnel, and my physicians from any responsibility for the consequences of my refusal.    o  Refuse   5.   I authorize the use of any specimen, organs, tissues, body parts or foreign objects that may be removed from my body during the operation/procedure for diagnosis, research or teaching purposes and their subsequent disposal by hospital authorities.  I also authorize the release of specimen test results and/or written reports to my treating physician on the hospital medical staff or other referring or consulting physicians involved in my care, at the discretion of the Pathologist or my treating physician.    6.   I consent to the photographing or videotaping of the operations or procedures to be performed, including appropriate portions of my body for medical, scientific, or educational purposes, provided my identity is not revealed by the pictures or by descriptive texts accompanying them.  If the procedure has been photographed/videotaped, the surgeon will obtain the original picture, image, videotape or CD.  The hospital will not be responsible for storage, release or maintenance of the picture, image, tape or CD.    7.   I consent to the presence of a  or observers in the operating room as deemed necessary by my physician or their designees.    8.   I recognize that in the event my procedure results in extended X-Ray/fluoroscopy time, I may develop a skin reaction.    9. If I have a Do Not Attempt  Resuscitation (DNAR) order in place, that status will be suspended while in the operating room, procedural suite, and during the recovery period unless otherwise explicitly stated by me (or a person authorized to consent on my behalf). The surgeon or my attending physician will determine when the applicable recovery period ends for purposes of reinstating the DNAR order.  10. Patients having a sterilization procedure: I understand that if the procedure is successful the results will be permanent and it will therefore be impossible for me to inseminate, conceive, or bear children.  I also understand that the procedure is intended to result in sterility, although the result has not been guaranteed.   11. I acknowledge that my physician has explained sedation/analgesia administration to me including the risk and benefits I consent to the administration of sedation/analgesia as may be necessary or desirable in the judgment of my physician.    I CERTIFY THAT I HAVE READ AND FULLY UNDERSTAND THE ABOVE CONSENT TO OPERATION and/or OTHER PROCEDURE.     ____________________________________  _________________________________        ______________________________  Signature of Patient    Signature of Responsible Person                Printed Name of Responsible Person                                      ____________________________________  _____________________________                ________________________________  Signature of Witness        Date  Time         Relationship to Patient    STATEMENT OF PHYSICIAN My signature below affirms that prior to the time of the procedure; I have explained to the patient and/or his/her legal representative, the risks and benefits involved in the proposed treatment and any reasonable alternative to the proposed treatment. I have also explained the risks and benefits involved in refusal of the proposed treatment and alternatives to the proposed treatment and have answered the patient's  questions. If I have a significant financial interest in a co-management agreement or a significant financial interest in any product or implant, or other significant relationship used in this procedure/surgery, I have disclosed this and had a discussion with my patient.     _____________________________________________________              _____________________________  (Signature of Physician)                                                                                         (Date)                                   (Time)  Patient Name: Mandy Manriquez      : 1950      Printed: 3/11/2025     Medical Record #: [id number]                                      Page 1 of 1

## (undated) NOTE — LETTER
[address], [place]  Authorization for Invasive Procedures  1. I hereby authorize Dr. Eleni Solis , my physician and whomever may be designated as the doctor's assistant, to perform the following operation and/or procedure:  Colonoscopy on Ledon Spare at [facility].    2. My physician has explained to me the nature and purpose of the operation or other procedure, possible alternative methods of treatment, the risks involved and the possibility of complications to me. I understand the probable consequences of declining the recommended procedure and the alternative methods of treatment. I acknowledge that no guarantee has been made as to the result that may be obtained. 3. I recognize that during the course of this operation or other procedure, unforeseen conditions may necessitate additional or different procedures than those listed above. I, therefore, further authorize and request that the above-named physician, his/her physician assistants, or designees perform such procedures as are, in his/her professional opinion, necessary and desirable. If I have a Do Not Attempt Resuscitation (DNAR) order in place, that status will be suspended while in the operating room, procedural suite, and during the recovery period unless otherwise explicitly stated by me (or a person authorized to consent on my behalf). The surgeon or my attending physician will determine when the applicable recovery period ends for purposes of reinstating the DNAR order. 4. Should the need arise during my operation or immediate post-operative period; I also consent to the administration of blood and/or blood products.  Further, I understand that despite careful testing and screening of blood and blood products, I may still be subject to ill effects as a result of recieving a blood transfusion an/or blood producst. The following are some, but not all, of the potential risks that can occur: fever and allergic reactions, hemolytic reactions, transmission of disease such as hepatitis, AIDS, cytomegalovirus (CMV), and flluid overload. In the event that I wish to have autologous transfusions of my own blood, or a directed donor transfusion, I will discuss this with my physician. 5. I consent to the photographing of the operations or procedures to be performed for the purposes of advancing medicine, science, and/or education, provided my identity is not revealed. If the procedure has been videotaped, the physician/surgeon will obtain the original videotape. The hospital will not be responsible for storage or maintenance of this tape. 6. I consent to the presence of a  or observer as deemed necessary by my physician or his designee. 7. Any tissues or organs removed in the operation or other procedure may be disposed of by and at the discretion of [facility].    8. I understand that the physician and his/her physician assistants may not be employees or agents of [facility], [facility], nor [facility], but are independent medical practitioners who have been permitted to use its facilities for the care and treatment of their patients. 9. Patients having a sterilization procedure: I understand that if the procedure is successful the results will be permanent and it will therefore be impossible for me to inseminate, conceive or bear children. I also understand that the procedure is intended to result in sterility, although the result has not been guaranteed. 10. I CERTIFY THAT I HAVE READ AND FULLY UNDERSTAND THE ABOVE CONSENT TO OPERATION and/or OTHER PROCEDURE. 11. I acknowledge that my physician has explained sedation/analgesia administration to me including the risks and benefits. I consent to the administration of sedation/analgesia as may be necessary or desirable in the judgment of my physician. Signature of Patient:  ________________________________________________ Date: _________Time: _________    Responsible person in case of minor or unconscious: _____________________________Relationship: ____________     Witness Signature: ____________________________________________ Date: __________ Time: ___________    Statement of Physician  My signature below affirms that prior to the time of the procedure, I have explained to the patient and/or her legal representative, the risks and benefits involved in the proposed treatment and any reasonable alternative to the proposed treatment. I have also explained the risks and benefits involved in the refusal of the proposed treatment and have answered the patient's questions. If I have a significant financial interest in this procedure/surgery, I have disclosed this and had a discussion with my patient.     Signature of Physician:   ________________________________________Date: _________Time:_______ Patient Name: Bill Bojorquez  : 1950   Printed: 2022    Medical Record #: [id number]